# Patient Record
Sex: MALE | Race: WHITE | NOT HISPANIC OR LATINO | Employment: FULL TIME | URBAN - METROPOLITAN AREA
[De-identification: names, ages, dates, MRNs, and addresses within clinical notes are randomized per-mention and may not be internally consistent; named-entity substitution may affect disease eponyms.]

---

## 2017-08-07 ENCOUNTER — TELEPHONE (OUTPATIENT)
Dept: FAMILY MEDICINE | Facility: CLINIC | Age: 32
End: 2017-08-07

## 2017-08-07 ENCOUNTER — TELEPHONE (OUTPATIENT)
Dept: ENDOCRINOLOGY | Facility: CLINIC | Age: 32
End: 2017-08-07

## 2017-08-07 NOTE — TELEPHONE ENCOUNTER
----- Message from Savana Orellana sent at 8/7/2017 12:56 PM CDT -----  Patient states he missed a call from office please call [phone] (sggp)

## 2017-08-07 NOTE — TELEPHONE ENCOUNTER
----- Message from Savana Wray sent at 8/1/2017  2:42 PM CDT -----  Contact: Niyah Joy called regarding scheduling the patient a new patient appt in [place]. Stating for itching, anxiety, facial flushing and pheochromocytoma. Please contact [phone] (home)

## 2017-08-07 NOTE — TELEPHONE ENCOUNTER
S/W Niyah @ Dr Joy' [facility] in [place], [phone]: states pt needs to see Endocrinology for itching, anxiety, facial flushing, & poss pheochromocytoma or poss adrenal insufficiency; saw another endocrinologist in [place]; asked Niyah to fax pt's records & any scans; states she will contact pt to see if he has any records from this previous Endocrinologist & send what if any records they have & see if pt is willing to drive here to [place]-they were hoping to have him seen @ our [facility] but Dr Alston is out; will call us back once she gets records together; advised her we have Clayton Moreno & [facility] & appt would be next available.

## 2017-08-07 NOTE — TELEPHONE ENCOUNTER
----- Message from Emely Aragon sent at 8/7/2017  4:11 PM CDT -----  Contact: PAtient  Geronimo, patient [phone], Returning the nurse's call regarding an appointment. Call to POD. Please advise. Thanks.

## 2017-08-08 ENCOUNTER — TELEPHONE (OUTPATIENT)
Dept: ENDOCRINOLOGY | Facility: CLINIC | Age: 32
End: 2017-08-08

## 2017-08-08 NOTE — TELEPHONE ENCOUNTER
----- Message from Cinthya Nolasco sent at 8/8/2017  8:11 AM CDT -----  Contact: self [phone]  Call placed to pod. Patient returned your call, please call back.  Thank you!

## 2017-08-08 NOTE — TELEPHONE ENCOUNTER
Spoke with pt and confirmed NP appt for 10/25/17 at 10:00am. Referral from Dr. Jonny Joy ([place]).  Encouraged to call clinic with any concerns or questions prior to clinic appt date  Verbalized understanding

## 2017-10-25 ENCOUNTER — OFFICE VISIT (OUTPATIENT)
Dept: ENDOCRINOLOGY | Facility: CLINIC | Age: 32
End: 2017-10-25
Payer: COMMERCIAL

## 2017-10-25 VITALS
BODY MASS INDEX: 23.74 KG/M2 | DIASTOLIC BLOOD PRESSURE: 62 MMHG | HEART RATE: 64 BPM | SYSTOLIC BLOOD PRESSURE: 102 MMHG | WEIGHT: 184.94 LBS | HEIGHT: 74 IN

## 2017-10-25 DIAGNOSIS — F41.9 ANXIETY: ICD-10-CM

## 2017-10-25 DIAGNOSIS — R23.2 FLUSHING: Primary | ICD-10-CM

## 2017-10-25 PROCEDURE — 99204 OFFICE O/P NEW MOD 45 MIN: CPT | Mod: S$GLB,,, | Performed by: INTERNAL MEDICINE

## 2017-10-25 PROCEDURE — 99999 PR PBB SHADOW E&M-EST. PATIENT-LVL II: CPT | Mod: PBBFAC,,, | Performed by: INTERNAL MEDICINE

## 2017-10-25 RX ORDER — ALPRAZOLAM 0.5 MG/1
0.5 TABLET ORAL
COMMUNITY
Start: 2017-09-19 | End: 2018-09-19

## 2017-10-25 RX ORDER — ESZOPICLONE 3 MG/1
3 TABLET, FILM COATED ORAL
COMMUNITY
Start: 2017-02-24 | End: 2018-02-24

## 2017-10-25 RX ORDER — MELOXICAM 7.5 MG/1
7.5 TABLET ORAL
COMMUNITY
Start: 2017-03-27

## 2017-10-25 RX ORDER — OMEPRAZOLE 40 MG/1
40 CAPSULE, DELAYED RELEASE ORAL
COMMUNITY
Start: 2017-04-20 | End: 2018-04-20

## 2017-10-25 RX ORDER — DEXTROMETHORPHAN HYDROBROMIDE, GUAIFENESIN, AND PHENYLEPHRINE HYDROCHLORIDE 17.5; 385; 1 MG/1; MG/1; MG/1
1 TABLET ORAL
COMMUNITY
Start: 2017-09-12

## 2017-10-25 RX ORDER — POLYETHYLENE GLYCOL 3350 17 G/17G
POWDER, FOR SOLUTION ORAL
COMMUNITY

## 2017-10-25 RX ORDER — BUPROPION HYDROCHLORIDE 150 MG/1
150 TABLET ORAL
COMMUNITY
Start: 2017-07-03 | End: 2018-07-03

## 2017-10-25 NOTE — PROGRESS NOTES
CHIEF COMPLAINT: Flushing  32 year old being seen as a new patient. Approx since May 2017 had been having flushing- Face and torso. Occasionally will have panic attacks. Could range from 2/week to 2/month. Would have anxiety and palpitations. No HA. He felt flush at the time. No history of HTN. States symptoms started prior to wellbutrin but slightly worsened by wellbutrin. Last panic attack was sept. No wheezing or diarrhea      PAST MEDICAL HISTORY/PAST SURGICAL HISTORY:  Reviewed in EPIC    SOCIAL HISTORY: No T/A    FAMILY HISTORY:  No known thyroid disease. + HTN. + DM 2    MEDICATIONS/ALLERGIES: The patient's MedCard has been updated and reviewed.      ROS:   Constitutional: weight decreased.   Eyes: No recent visual changes  ENT: No dysphagia  Cardiovascular: Denies current anginal symptoms  Respiratory: Denies current respiratory difficulty  Gastrointestinal: Denies recent bowel disturbances  GenitoUrinary - No dysuria  Skin: No new skin rash  Neurologic: No focal neurologic complaints  Remainder ROS negative        PE:    GENERAL: Well developed, well nourished.  PSYCH:  appropriate mood and affect  EYES:  PERRL, EOM intact.  ENT: Nares patent, oropharynx clear, mucosa pink,   NECK: Supple, trachea midline, No palpable thyroid nodules.   CHEST: Resp even and unlabored, CTA bilateral.  CARDIAC: RRR, S1, S2 heard, no murmurs, rubs, S3, or S4  ABDOMEN: Soft, non-tender, non-distended;  No organomegaly  VASCULAR:  DP pulses +2/4 bilaterally, no edema  NEURO: Gait steady, CN II-VII grossly intact  SKIN: No areas of breakdown, no acanthosis nigracans.    LABS   6/9/17  TSH 1.0  CORTISOL 6.8 (10:58 AM)    ASSESSMENT/PLAN:  1. Flushing  2. Anxiety- Panic attacks    PLAN:  Will r/o pheo and carcinoid. No evidence of a thyroid disorder. Discussed that if w/u negative may need to repeat testing after a panic attack. Discussed that wellbutrin can have some flushing and diaphoresis.       FOLLOWUP  24 hour urine 5HIAA,  metanephrines, catecholamines- (External)

## 2017-10-25 NOTE — LETTER
October 25, 2017      Jonny Joy MD  [facility]   [place] [address]           [facility]  [address] [facility] [address] [place] [address]  Phone: [phone]  Fax: [phone]          Patient: Geronimo Taylor   MR Number: [id number]   YOB: 1985   Date of Visit: 10/25/2017       Dear Dr. Jonny Joy:    Thank you for referring Geronimo Taylor to me for evaluation. Attached you will find relevant portions of my assessment and plan of care.    If you have questions, please do not hesitate to call me. I look forward to following Geronimo Taylor along with you.    Sincerely,    Tay Moreno, DO [facility]  CC:  No Recipients    If you would like to receive this communication electronically, please contact [email or web address] or [phone] to request more information on [facility] access.    For providers and/or their staff who would like to refer a patient to [facility], please contact us through our one-stop-shop provider referral line, [facility], at [phone].    If you feel you have received this communication in error or would no longer like to receive these types of communications, please e-mail [email or web address]

## 2017-11-06 ENCOUNTER — TELEPHONE (OUTPATIENT)
Dept: ENDOCRINOLOGY | Facility: CLINIC | Age: 32
End: 2017-11-06

## 2017-11-06 DIAGNOSIS — F41.9 ANXIETY: Primary | ICD-10-CM

## 2017-11-20 ENCOUNTER — TELEPHONE (OUTPATIENT)
Dept: ENDOCRINOLOGY | Facility: CLINIC | Age: 32
End: 2017-11-20

## 2017-11-20 NOTE — TELEPHONE ENCOUNTER
----- Message from Yady Chavez sent at 11/20/2017 11:30 AM CST -----  Contact: pt  Pt is calling to see if the office got his test results yet..[phone] (home)

## 2021-01-11 NOTE — TELEPHONE ENCOUNTER
----- Message from Cory Wilcox sent at 11/6/2017 10:40 AM CST -----  Contact: self [phone]  Patient called asking if the nurse received recent lab results. The patient states he had another anxiety attack, asking for orders for lab work. Thanks!    
Orders placed  
The patient had another panic attack on Friday. He wants to do any repeat lab or urine in [place].   
Less than monthly